# Patient Record
Sex: MALE | Race: BLACK OR AFRICAN AMERICAN | NOT HISPANIC OR LATINO
[De-identification: names, ages, dates, MRNs, and addresses within clinical notes are randomized per-mention and may not be internally consistent; named-entity substitution may affect disease eponyms.]

---

## 2019-05-29 ENCOUNTER — NON-APPOINTMENT (OUTPATIENT)
Age: 69
End: 2019-05-29

## 2019-05-29 ENCOUNTER — APPOINTMENT (OUTPATIENT)
Dept: HEART AND VASCULAR | Facility: CLINIC | Age: 69
End: 2019-05-29
Payer: MEDICARE

## 2019-05-29 VITALS — DIASTOLIC BLOOD PRESSURE: 90 MMHG | SYSTOLIC BLOOD PRESSURE: 130 MMHG

## 2019-05-29 VITALS
HEART RATE: 81 BPM | SYSTOLIC BLOOD PRESSURE: 143 MMHG | DIASTOLIC BLOOD PRESSURE: 105 MMHG | OXYGEN SATURATION: 100 % | BODY MASS INDEX: 20.7 KG/M2 | HEIGHT: 69.5 IN | WEIGHT: 143 LBS

## 2019-05-29 PROCEDURE — 99204 OFFICE O/P NEW MOD 45 MIN: CPT

## 2019-05-29 PROCEDURE — 93000 ELECTROCARDIOGRAM COMPLETE: CPT

## 2019-05-29 NOTE — PHYSICAL EXAM
[General Appearance - Well Developed] : well developed [Normal Appearance] : normal appearance [Well Groomed] : well groomed [General Appearance - Well Nourished] : well nourished [No Deformities] : no deformities [General Appearance - In No Acute Distress] : no acute distress [Normal Conjunctiva] : the conjunctiva exhibited no abnormalities [Normal Oral Mucosa] : normal oral mucosa [Eyelids - No Xanthelasma] : the eyelids demonstrated no xanthelasmas [No Oral Pallor] : no oral pallor [Normal Jugular Venous A Waves Present] : normal jugular venous A waves present [No Oral Cyanosis] : no oral cyanosis [Normal Jugular Venous V Waves Present] : normal jugular venous V waves present [No Jugular Venous Bosch A Waves] : no jugular venous bosch A waves [Heart Rate And Rhythm] : heart rate and rhythm were normal [Heart Sounds] : normal S1 and S2 [Murmurs] : no murmurs present [Respiration, Rhythm And Depth] : normal respiratory rhythm and effort [Exaggerated Use Of Accessory Muscles For Inspiration] : no accessory muscle use [Auscultation Breath Sounds / Voice Sounds] : lungs were clear to auscultation bilaterally [Abdomen Soft] : soft [Abdomen Tenderness] : non-tender [Abdomen Mass (___ Cm)] : no abdominal mass palpated [Gait - Sufficient For Exercise Testing] : the gait was sufficient for exercise testing [Abnormal Walk] : normal gait [Nail Clubbing] : no clubbing of the fingernails [Cyanosis, Localized] : no localized cyanosis [Petechial Hemorrhages (___cm)] : no petechial hemorrhages [Skin Color & Pigmentation] : normal skin color and pigmentation [] : no rash [No Venous Stasis] : no venous stasis [Skin Lesions] : no skin lesions [No Skin Ulcers] : no skin ulcer [Oriented To Time, Place, And Person] : oriented to person, place, and time [No Xanthoma] : no  xanthoma was observed [Mood] : the mood was normal [Affect] : the affect was normal [No Anxiety] : not feeling anxious

## 2019-07-12 ENCOUNTER — OTHER (OUTPATIENT)
Age: 69
End: 2019-07-12

## 2019-08-07 ENCOUNTER — APPOINTMENT (OUTPATIENT)
Dept: HEART AND VASCULAR | Facility: CLINIC | Age: 69
End: 2019-08-07

## 2019-10-26 ENCOUNTER — EMERGENCY (EMERGENCY)
Facility: HOSPITAL | Age: 69
LOS: 1 days | Discharge: HOME | End: 2019-10-26
Attending: EMERGENCY MEDICINE | Admitting: EMERGENCY MEDICINE
Payer: COMMERCIAL

## 2019-10-26 VITALS
HEIGHT: 69 IN | HEART RATE: 94 BPM | TEMPERATURE: 100 F | WEIGHT: 134.92 LBS | RESPIRATION RATE: 16 BRPM | OXYGEN SATURATION: 99 % | SYSTOLIC BLOOD PRESSURE: 143 MMHG | DIASTOLIC BLOOD PRESSURE: 92 MMHG

## 2019-10-26 VITALS
TEMPERATURE: 98 F | HEART RATE: 53 BPM | DIASTOLIC BLOOD PRESSURE: 80 MMHG | OXYGEN SATURATION: 99 % | RESPIRATION RATE: 16 BRPM | SYSTOLIC BLOOD PRESSURE: 140 MMHG

## 2019-10-26 LAB
ALBUMIN SERPL ELPH-MCNC: 3.2 G/DL — LOW (ref 3.3–5)
ALP SERPL-CCNC: 60 U/L — SIGNIFICANT CHANGE UP (ref 40–120)
ALT FLD-CCNC: 10 U/L — SIGNIFICANT CHANGE UP (ref 10–45)
ANION GAP SERPL CALC-SCNC: 10 MMOL/L — SIGNIFICANT CHANGE UP (ref 5–17)
APPEARANCE UR: CLEAR — SIGNIFICANT CHANGE UP
APTT BLD: 31.1 SEC — SIGNIFICANT CHANGE UP (ref 27.5–36.3)
AST SERPL-CCNC: 11 U/L — SIGNIFICANT CHANGE UP (ref 10–40)
BASOPHILS # BLD AUTO: 0.01 K/UL — SIGNIFICANT CHANGE UP (ref 0–0.2)
BASOPHILS NFR BLD AUTO: 0.2 % — SIGNIFICANT CHANGE UP (ref 0–2)
BILIRUB SERPL-MCNC: 0.2 MG/DL — SIGNIFICANT CHANGE UP (ref 0.2–1.2)
BILIRUB UR-MCNC: NEGATIVE — SIGNIFICANT CHANGE UP
BUN SERPL-MCNC: 5 MG/DL — LOW (ref 7–23)
CALCIUM SERPL-MCNC: 8.2 MG/DL — LOW (ref 8.4–10.5)
CHLORIDE SERPL-SCNC: 107 MMOL/L — SIGNIFICANT CHANGE UP (ref 96–108)
CO2 SERPL-SCNC: 25 MMOL/L — SIGNIFICANT CHANGE UP (ref 22–31)
COLOR SPEC: YELLOW — SIGNIFICANT CHANGE UP
CREAT SERPL-MCNC: 0.86 MG/DL — SIGNIFICANT CHANGE UP (ref 0.5–1.3)
DIFF PNL FLD: NEGATIVE — SIGNIFICANT CHANGE UP
EOSINOPHIL # BLD AUTO: 0.07 K/UL — SIGNIFICANT CHANGE UP (ref 0–0.5)
EOSINOPHIL NFR BLD AUTO: 1.7 % — SIGNIFICANT CHANGE UP (ref 0–6)
GLUCOSE SERPL-MCNC: 103 MG/DL — HIGH (ref 70–99)
GLUCOSE UR QL: NEGATIVE — SIGNIFICANT CHANGE UP
HCT VFR BLD CALC: 30.3 % — LOW (ref 39–50)
HGB BLD-MCNC: 10 G/DL — LOW (ref 13–17)
IMM GRANULOCYTES NFR BLD AUTO: 0.2 % — SIGNIFICANT CHANGE UP (ref 0–1.5)
INR BLD: 1.13 — SIGNIFICANT CHANGE UP (ref 0.88–1.16)
KETONES UR-MCNC: NEGATIVE — SIGNIFICANT CHANGE UP
LACTATE SERPL-SCNC: 0.8 MMOL/L — SIGNIFICANT CHANGE UP (ref 0.5–2)
LEUKOCYTE ESTERASE UR-ACNC: NEGATIVE — SIGNIFICANT CHANGE UP
LIDOCAIN IGE QN: 18 U/L — SIGNIFICANT CHANGE UP (ref 7–60)
LYMPHOCYTES # BLD AUTO: 1.08 K/UL — SIGNIFICANT CHANGE UP (ref 1–3.3)
LYMPHOCYTES # BLD AUTO: 25.5 % — SIGNIFICANT CHANGE UP (ref 13–44)
MCHC RBC-ENTMCNC: 30.8 PG — SIGNIFICANT CHANGE UP (ref 27–34)
MCHC RBC-ENTMCNC: 33 GM/DL — SIGNIFICANT CHANGE UP (ref 32–36)
MCV RBC AUTO: 93.2 FL — SIGNIFICANT CHANGE UP (ref 80–100)
MONOCYTES # BLD AUTO: 0.47 K/UL — SIGNIFICANT CHANGE UP (ref 0–0.9)
MONOCYTES NFR BLD AUTO: 11.1 % — SIGNIFICANT CHANGE UP (ref 2–14)
NEUTROPHILS # BLD AUTO: 2.6 K/UL — SIGNIFICANT CHANGE UP (ref 1.8–7.4)
NEUTROPHILS NFR BLD AUTO: 61.3 % — SIGNIFICANT CHANGE UP (ref 43–77)
NITRITE UR-MCNC: NEGATIVE — SIGNIFICANT CHANGE UP
NRBC # BLD: 0 /100 WBCS — SIGNIFICANT CHANGE UP (ref 0–0)
PH UR: 7 — SIGNIFICANT CHANGE UP (ref 5–8)
PLATELET # BLD AUTO: 274 K/UL — SIGNIFICANT CHANGE UP (ref 150–400)
POTASSIUM SERPL-MCNC: 3.4 MMOL/L — LOW (ref 3.5–5.3)
POTASSIUM SERPL-SCNC: 3.4 MMOL/L — LOW (ref 3.5–5.3)
PROT SERPL-MCNC: 6 G/DL — SIGNIFICANT CHANGE UP (ref 6–8.3)
PROT UR-MCNC: NEGATIVE MG/DL — SIGNIFICANT CHANGE UP
PROTHROM AB SERPL-ACNC: 12.8 SEC — SIGNIFICANT CHANGE UP (ref 10–12.9)
RBC # BLD: 3.25 M/UL — LOW (ref 4.2–5.8)
RBC # FLD: 14.9 % — HIGH (ref 10.3–14.5)
SODIUM SERPL-SCNC: 142 MMOL/L — SIGNIFICANT CHANGE UP (ref 135–145)
SP GR SPEC: 1.01 — SIGNIFICANT CHANGE UP (ref 1–1.03)
UROBILINOGEN FLD QL: 0.2 E.U./DL — SIGNIFICANT CHANGE UP
WBC # BLD: 4.24 K/UL — SIGNIFICANT CHANGE UP (ref 3.8–10.5)
WBC # FLD AUTO: 4.24 K/UL — SIGNIFICANT CHANGE UP (ref 3.8–10.5)

## 2019-10-26 PROCEDURE — 74177 CT ABD & PELVIS W/CONTRAST: CPT

## 2019-10-26 PROCEDURE — 36415 COLL VENOUS BLD VENIPUNCTURE: CPT

## 2019-10-26 PROCEDURE — 99284 EMERGENCY DEPT VISIT MOD MDM: CPT

## 2019-10-26 PROCEDURE — 86900 BLOOD TYPING SEROLOGIC ABO: CPT

## 2019-10-26 PROCEDURE — 80053 COMPREHEN METABOLIC PANEL: CPT

## 2019-10-26 PROCEDURE — 85610 PROTHROMBIN TIME: CPT

## 2019-10-26 PROCEDURE — 99284 EMERGENCY DEPT VISIT MOD MDM: CPT | Mod: 25

## 2019-10-26 PROCEDURE — 81003 URINALYSIS AUTO W/O SCOPE: CPT

## 2019-10-26 PROCEDURE — 86850 RBC ANTIBODY SCREEN: CPT

## 2019-10-26 PROCEDURE — 85025 COMPLETE CBC W/AUTO DIFF WBC: CPT

## 2019-10-26 PROCEDURE — 85730 THROMBOPLASTIN TIME PARTIAL: CPT

## 2019-10-26 PROCEDURE — 74177 CT ABD & PELVIS W/CONTRAST: CPT | Mod: 26,77

## 2019-10-26 PROCEDURE — 83605 ASSAY OF LACTIC ACID: CPT

## 2019-10-26 PROCEDURE — 83690 ASSAY OF LIPASE: CPT

## 2019-10-26 PROCEDURE — 74177 CT ABD & PELVIS W/CONTRAST: CPT | Mod: 26

## 2019-10-26 PROCEDURE — 86901 BLOOD TYPING SEROLOGIC RH(D): CPT

## 2019-10-26 RX ORDER — SODIUM CHLORIDE 9 MG/ML
1000 INJECTION INTRAMUSCULAR; INTRAVENOUS; SUBCUTANEOUS ONCE
Refills: 0 | Status: COMPLETED | OUTPATIENT
Start: 2019-10-26 | End: 2019-10-26

## 2019-10-26 RX ORDER — IOHEXOL 300 MG/ML
30 INJECTION, SOLUTION INTRAVENOUS ONCE
Refills: 0 | Status: COMPLETED | OUTPATIENT
Start: 2019-10-26 | End: 2019-10-26

## 2019-10-26 RX ADMIN — SODIUM CHLORIDE 2000 MILLILITER(S): 9 INJECTION INTRAMUSCULAR; INTRAVENOUS; SUBCUTANEOUS at 15:54

## 2019-10-26 RX ADMIN — IOHEXOL 30 MILLILITER(S): 300 INJECTION, SOLUTION INTRAVENOUS at 15:29

## 2019-10-26 NOTE — ED PROVIDER NOTE - PHYSICAL EXAMINATION
CONSTITUTIONAL: Well appearing, well nourished, awake, alert and in no apparent distress.  HEENT: Head is atraumatic. Eyes clear bilaterally, normal EOMI. Airway patent.  CARDIAC: Normal rate, regular rhythm.  Heart sounds S1, S2.   RESPIRATORY: Breath sounds clear and equal bilaterally. no tachypnea, respiratory distress.   GASTROINTESTINAL: Abdomen soft, non-tender, no guarding, distension. L mass in scrotum and L inguinal region.   MUSCULOSKELETAL: Spine appears normal, no midline spinal tenderness, range of motion is not limited, no muscle or joint tenderness. no bony tenderness. no JVD, peripheral edema.   NEUROLOGICAL: Alert and oriented, no focal deficits, no motor or sensory deficits.  SKIN: Skin normal color for race, warm, dry and intact. No evidence of rash.  PSYCHIATRIC: Alert and oriented to person, place, time/situation. normal mood and affect. no apparent risk to self or others.

## 2019-10-26 NOTE — ED ADULT NURSE NOTE - OBJECTIVE STATEMENT
the pt is a 68 y/o male who complaining of groin pain. Pt has hx of inguinal hernia with repair. Pt c/o discomfort in the groin area.

## 2019-10-26 NOTE — ED PROVIDER NOTE - NSFOLLOWUPINSTRUCTIONS_ED_ALL_ED_FT
Hernia, Adult  Image       Image   A hernia is the bulging of an organ or tissue through a weak spot in the muscles of the abdomen (abdominal wall). Hernias develop most often near the belly button (navel) or the area where the leg meets the lower abdomen (groin).  Common types of hernias include:  Incisional hernia. This type bulges through a scar from an abdominal surgery.Umbilical hernia. This type develops near the navel.Inguinal hernia. This type develops in the groin or scrotum.Femoral hernia. This type develops under the groin, in the upper thigh area.Hiatal hernia. This type occurs when part of the stomach slides above the muscle that separates the abdomen from the chest (diaphragm).What are the causes?  This condition may be caused by:  Heavy lifting.Coughing over a long period of time.Straining to have a bowel movement. Constipation can lead to straining.An incision made during an abdominal surgery.A physical problem that is present at birth (congenital defect).Being overweight or obese.Smoking.Excess fluid in the abdomen.Undescended testicles in males.What are the signs or symptoms?  The main symptom is a skin-colored, rounded bulge in the area of the hernia. However, a bulge may not always be present. It may grow bigger or be more visible when you cough or strain (such as when lifting something heavy).  A hernia that can be pushed back into the area (is reducible) rarely causes pain. A hernia that cannot be pushed back into the area (is incarcerated) may lose its blood supply (become strangulated). A hernia that is incarcerated may cause:  Pain.Fever.Nausea and vomiting.Swelling.Constipation.How is this diagnosed?  A hernia may be diagnosed based on:  Your symptoms and medical history.A physical exam. Your health care provider may ask you to cough or move in certain ways to see if the hernia becomes visible.Imaging tests, such as:  X-rays.Ultrasound.CT scan.How is this treated?  A hernia that is small and painless may not need to be treated. A hernia that is large or painful may be treated with surgery. Inguinal hernias may be treated with surgery to prevent incarceration or strangulation. Strangulated hernias are always treated with surgery because a lack of blood supply to the trapped organ or tissue can cause it to die.  Surgery to treat a hernia involves pushing the bulge back into place and repairing the weak area of the muscle or abdominal wall.  Follow these instructions at home:  Activity     Avoid straining.Do not lift anything that is heavier than 10 lb (4.5 kg), or the limit that you are told, until your health care provider says that it is safe.When lifting heavy objects, lift with your leg muscles, not your back muscles.Preventing constipation     Take actions to prevent constipation. Constipation leads to straining with bowel movements, which can make a hernia worse or cause a hernia repair to break down. Your health care provider may recommend that you:  Drink enough fluid to keep your urine pale yellow.Eat foods that are high in fiber, such as fresh fruits and vegetables, whole grains, and beans.Limit foods that are high in fat and processed sugars, such as fried or sweet foods.Take an over-the-counter or prescription medicine for constipation.General instructions     When coughing, try to cough gently.You may try to push the hernia back in place by very gently pressing on it while lying down. Do not try to force the bulge back in if it will not push in easily.If you are overweight, work with your health care provider to lose weight safely.Do not use any products that contain nicotine or tobacco, such as cigarettes and e-cigarettes. If you need help quitting, ask your health care provider.If you are scheduled for hernia repair, watch your hernia for any changes in shape, size, or color. Tell your health care provider about any changes or new symptoms.Take over-the-counter and prescription medicines only as told by your health care provider.Keep all follow-up visits as told by your health care provider. This is important.Contact a health care provider if:  You develop new pain, swelling, or redness around your hernia.You have signs of constipation, such as:  Fewer bowel movements in a week than normal.Difficulty having a bowel movement.Stools that are dry, hard, or larger than normal.Get help right away if:  You have a fever.You have abdomen pain that gets worse.You feel nauseous or you vomit.You cannot push the hernia back in place by very gently pressing on it while lying down. Do not try to force the bulge back in if it will not push in easily.The hernia:  Changes in shape, size, or color.Feels hard or tender.These symptoms may represent a serious problem that is an emergency. Do not wait to see if the symptoms will go away. Get medical help right away. Call your local emergency services (911 in the [place]   Summary  A hernia is the bulging of an organ or tissue through a weak spot in the muscles of the abdomen (abdominal wall).The main symptom is a skin-colored, rounded lump (bulge) in the hernia area. However, a bulge may not always be present. It may grow bigger or more visible when you cough or strain (such as when having a bowel movement).A hernia that is small and painless may not need to be treated. A hernia that is large or painful may be treated with surgery.Surgery to treat a hernia involves pushing the bulge back into place and repairing the weak part of the abdomen.This information is not intended to replace advice given to you by your health care provider. Make sure you discuss any questions you have with your health care provider. ***PLEASE FOLLOW UP AT [facility]    [address]        Hernia, Adult  Image       Image   A hernia is the bulging of an organ or tissue through a weak spot in the muscles of the abdomen (abdominal wall). Hernias develop most often near the belly button (navel) or the area where the leg meets the lower abdomen (groin).  Common types of hernias include:  Incisional hernia. This type bulges through a scar from an abdominal surgery.Umbilical hernia. This type develops near the navel.Inguinal hernia. This type develops in the groin or scrotum.Femoral hernia. This type develops under the groin, in the upper thigh area.Hiatal hernia. This type occurs when part of the stomach slides above the muscle that separates the abdomen from the chest (diaphragm).What are the causes?  This condition may be caused by:  Heavy lifting.Coughing over a long period of time.Straining to have a bowel movement. Constipation can lead to straining.An incision made during an abdominal surgery.A physical problem that is present at birth (congenital defect).Being overweight or obese.Smoking.Excess fluid in the abdomen.Undescended testicles in males.What are the signs or symptoms?  The main symptom is a skin-colored, rounded bulge in the area of the hernia. However, a bulge may not always be present. It may grow bigger or be more visible when you cough or strain (such as when lifting something heavy).  A hernia that can be pushed back into the area (is reducible) rarely causes pain. A hernia that cannot be pushed back into the area (is incarcerated) may lose its blood supply (become strangulated). A hernia that is incarcerated may cause:  Pain.Fever.Nausea and vomiting.Swelling.Constipation.How is this diagnosed?  A hernia may be diagnosed based on:  Your symptoms and medical history.A physical exam. Your health care provider may ask you to cough or move in certain ways to see if the hernia becomes visible.Imaging tests, such as:  X-rays.Ultrasound.CT scan.How is this treated?  A hernia that is small and painless may not need to be treated. A hernia that is large or painful may be treated with surgery. Inguinal hernias may be treated with surgery to prevent incarceration or strangulation. Strangulated hernias are always treated with surgery because a lack of blood supply to the trapped organ or tissue can cause it to die.  Surgery to treat a hernia involves pushing the bulge back into place and repairing the weak area of the muscle or abdominal wall.  Follow these instructions at home:  Activity     Avoid straining.Do not lift anything that is heavier than 10 lb (4.5 kg), or the limit that you are told, until your health care provider says that it is safe.When lifting heavy objects, lift with your leg muscles, not your back muscles.Preventing constipation     Take actions to prevent constipation. Constipation leads to straining with bowel movements, which can make a hernia worse or cause a hernia repair to break down. Your health care provider may recommend that you:  Drink enough fluid to keep your urine pale yellow.Eat foods that are high in fiber, such as fresh fruits and vegetables, whole grains, and beans.Limit foods that are high in fat and processed sugars, such as fried or sweet foods.Take an over-the-counter or prescription medicine for constipation.General instructions     When coughing, try to cough gently.You may try to push the hernia back in place by very gently pressing on it while lying down. Do not try to force the bulge back in if it will not push in easily.If you are overweight, work with your health care provider to lose weight safely.Do not use any products that contain nicotine or tobacco, such as cigarettes and e-cigarettes. If you need help quitting, ask your health care provider.If you are scheduled for hernia repair, watch your hernia for any changes in shape, size, or color. Tell your health care provider about any changes or new symptoms.Take over-the-counter and prescription medicines only as told by your health care provider.Keep all follow-up visits as told by your health care provider. This is important.Contact a health care provider if:  You develop new pain, swelling, or redness around your hernia.You have signs of constipation, such as:  Fewer bowel movements in a week than normal.Difficulty having a bowel movement.Stools that are dry, hard, or larger than normal.Get help right away if:  You have a fever.You have abdomen pain that gets worse.You feel nauseous or you vomit.You cannot push the hernia back in place by very gently pressing on it while lying down. Do not try to force the bulge back in if it will not push in easily.The hernia:  Changes in shape, size, or color.Feels hard or tender.These symptoms may represent a serious problem that is an emergency. Do not wait to see if the symptoms will go away. Get medical help right away. Call your local emergency services (911 in the [place]   Summary  A hernia is the bulging of an organ or tissue through a weak spot in the muscles of the abdomen (abdominal wall).The main symptom is a skin-colored, rounded lump (bulge) in the hernia area. However, a bulge may not always be present. It may grow bigger or more visible when you cough or strain (such as when having a bowel movement).A hernia that is small and painless may not need to be treated. A hernia that is large or painful may be treated with surgery.Surgery to treat a hernia involves pushing the bulge back into place and repairing the weak part of the abdomen.This information is not intended to replace advice given to you by your health care provider. Make sure you discuss any questions you have with your health care provider.

## 2019-10-26 NOTE — CONSULT NOTE ADULT - ASSESSMENT
Mr. Boggs is a 68 yo M with long-standing chronic left inguinal hernia without signs of incarceration or strangulation.     - CT imaging shows passage of PO contrast within small intestine within hernia sac and continues to large bowel and rectum. No signs of obstruction.   - Patient instructed to return to ED of fevers, chills, increasing pain, signs of hernia incarceration or strangulation, or any other concerns.  - Patient instructed to follow-up with [facility] outpatient -- given address and phone number: [address] [place] [address], [phone].  - Plan discussed with attending and chief resident.

## 2019-10-26 NOTE — CONSULT NOTE ADULT - ATTENDING COMMENTS
Pt was seen and examined by me in ER.  Chronic large left inguinal hernia/scrotal hernia.  Contains loops of bowel.  No skin changes.  No clinical signs of bowel obstruction. No distention.  Passing flatus.  Recommend to repeat CT with PO contrast.  If proves to have no obstruction, would recommend outpatient follow up with ACS clinic and elective hernia repair.

## 2019-10-26 NOTE — ED ADULT NURSE NOTE - NSIMPLEMENTINTERV_GEN_ALL_ED
Implemented All Universal Safety Interventions:  [place] to call system. Call bell, personal items and telephone within reach. Instruct patient to call for assistance. Room bathroom lighting operational. Non-slip footwear when patient is off stretcher. Physically safe environment: no spills, clutter or unnecessary equipment. Stretcher in lowest position, wheels locked, appropriate side rails in place.

## 2019-10-26 NOTE — ED PROVIDER NOTE - PATIENT PORTAL LINK FT
You can access the FollowMyHealth Patient Portal offered by [facility] by registering at the following website: [email or web address]. By joining [employer]’s FollowMyHealth portal, you will also be able to view your health information using other applications (apps) compatible with our system.

## 2019-10-26 NOTE — ED PROVIDER NOTE - OBJECTIVE STATEMENT
68 yo with 2 prior inguinal, scrotal hernia repairs w  L inguinal pain for the past one year, states was referred to ED by pcp. No  n/v/f/c. states passed gas and flatus this morning. 70 yo with 2 prior inguinal hernias, scrotal hernia repairs w  L inguinal pain for the past one year, states was referred to ED by pcp. No  n/v/f/c. states passed gas and flatus this morning. 68 yo with 2 prior inguinal hernias repairs w  L inguinal pain and swelling for the past one year, states was referred to ED by pcp. No  n/v/f/c. states passed gas and flatus this morning. no abdominal distension. no GI bleed sx. 70 yo with 2 prior inguinal hernias repair w L inguinal pain and swelling for the past one year, states was referred to ED by pcp. No  n/v/f/c. states passed gas and flatus this morning. no abdominal distension. no GI bleed sx. no cp, sob or other complaints.

## 2019-10-26 NOTE — CONSULT NOTE ADULT - SUBJECTIVE AND OBJECTIVE BOX
GENERAL SURGERY CONSULT NOTE    HPI:    Mr. Boggs is a 68 yo M with history of long-standing left inguinal hernia presenting to ED because of suggestion from his PCP. Patient reports that he first noticed his hernia 1 year. He reports that it has been enlarging, but otherwise denies any increasing pain or changes in his symptoms. He denies fevers, chills, nausea, or emesis. He had a normal bowel movement this morning without hematochezia or melena and report actively passing flatus including in the ED.    Patient is afebrile and remaining vitals are within normal limits. WBC is 4, lactate is wnl, CT imaging shows a chronic left inguinal hernia, without signs of obstruction.    PMH/PSx; prior R inguinal hernia repair  Allergies: NKDA  Meds: denies  FH: noncontributory  SH: noncontributory    REVIEW OF SYSTEMS:   Pertinent positives/negatives noted in HPI.       Vital Signs Last 24 Hrs  T(C): 36.9 (26 Oct 2019 18:12), Max: 37.6 (26 Oct 2019 10:23)  T(F): 98.4 (26 Oct 2019 18:12), Max: 99.7 (26 Oct 2019 10:23)  HR: 53 (26 Oct 2019 18:12) (53 - 94)  BP: 140/80 (26 Oct 2019 18:12) (140/80 - 163/94)  BP(mean): --  RR: 16 (26 Oct 2019 18:12) (16 - 16)  SpO2: 99% (26 Oct 2019 18:12) (96% - 99%)    PHYSICAL EXAM:  General: no acute distress  Cardiovascular: NSR  Pulmonary: nonlabored breathing, no respiratory distress  Abdomen: mild LLQ tenderness without guarding or rebound. Large left inguinal hernia extending into left hemiscrotum; mild tenderness to palpation. No overlying skin changes.  Groin: R groin open inguinal hernia repair incisional scar - well-healed  Extremities: nonedematous    LABS:                        10.0   4.24  )-----------( 274      ( 26 Oct 2019 11:21 )             30.3     10-26    142  |  107  |  5<L>  ----------------------------<  103<H>  3.4<L>   |  25  |  0.86    Ca    8.2<L>      26 Oct 2019 11:21    TPro  6.0  /  Alb  3.2<L>  /  TBili  0.2  /  DBili  x   /  AST  11  /  ALT  10  /  AlkPhos  60  10-26    PT/INR - ( 26 Oct 2019 17:06 )   PT: 12.8 sec;   INR: 1.13          PTT - ( 26 Oct 2019 17:06 )  PTT:31.1 sec  Urinalysis Basic - ( 26 Oct 2019 11:47 )    Color: Yellow / Appearance: Clear / S.010 / pH: x  Gluc: x / Ketone: NEGATIVE  / Bili: Negative / Urobili: 0.2 E.U./dL   Blood: x / Protein: NEGATIVE mg/dL / Nitrite: NEGATIVE   Leuk Esterase: NEGATIVE / RBC: x / WBC x   Sq Epi: x / Non Sq Epi: x / Bacteria: x

## 2019-10-30 DIAGNOSIS — K40.90 UNILATERAL INGUINAL HERNIA, WITHOUT OBSTRUCTION OR GANGRENE, NOT SPECIFIED AS RECURRENT: ICD-10-CM

## 2019-10-30 DIAGNOSIS — R10.32 LEFT LOWER QUADRANT PAIN: ICD-10-CM

## 2019-11-01 ENCOUNTER — APPOINTMENT (OUTPATIENT)
Dept: SURGERY | Facility: CLINIC | Age: 69
End: 2019-11-01
Payer: MEDICARE

## 2019-11-01 VITALS
HEIGHT: 69.5 IN | WEIGHT: 142 LBS | HEART RATE: 82 BPM | SYSTOLIC BLOOD PRESSURE: 158 MMHG | BODY MASS INDEX: 20.56 KG/M2 | TEMPERATURE: 99.1 F | DIASTOLIC BLOOD PRESSURE: 100 MMHG

## 2019-11-01 DIAGNOSIS — K40.90 UNILATERAL INGUINAL HERNIA, W/OUT OBSTRUCTION OR GANGRENE, NOT SPECIFIED AS RECURRENT: ICD-10-CM

## 2019-11-01 DIAGNOSIS — I10 ESSENTIAL (PRIMARY) HYPERTENSION: ICD-10-CM

## 2019-11-01 DIAGNOSIS — F17.200 NICOTINE DEPENDENCE, UNSPECIFIED, UNCOMPLICATED: ICD-10-CM

## 2019-11-01 DIAGNOSIS — F17.210 NICOTINE DEPENDENCE, CIGARETTES, UNCOMPLICATED: ICD-10-CM

## 2019-11-01 DIAGNOSIS — I73.9 PERIPHERAL VASCULAR DISEASE, UNSPECIFIED: ICD-10-CM

## 2019-11-01 DIAGNOSIS — Z00.00 ENCOUNTER FOR GENERAL ADULT MEDICAL EXAMINATION W/OUT ABNORMAL FINDINGS: ICD-10-CM

## 2019-11-01 PROCEDURE — 99203 OFFICE O/P NEW LOW 30 MIN: CPT

## 2019-11-01 NOTE — HISTORY OF PRESENT ILLNESS
[de-identified] : patient had recurrent inguinal hernia that was repaired last in 2008 , now large , incarcerated left scrotal and inguinal with no signs of obstruction r

## 2019-11-01 NOTE — PHYSICAL EXAM
[JVD] : no jugular venous distention  [Normal Breath Sounds] : Normal breath sounds [Normal Heart Sounds] : normal heart sounds [Abdominal Masses] : No abdominal masses [Abdomen Tenderness] : ~T ~M No abdominal tenderness [Stool Sample Taken] : No stool obtained  on rectal exam [No Rash or Lesion] : No rash or lesion [Purpura] : no purpura  [Petechiae] : no petechiae [Skin Ulcer] : no ulcer [Skin Induration] : no induration [Alert] : alert [Oriented to Person] : oriented to person [Oriented to Place] : oriented to place [Oriented to Time] : oriented to time [Calm] : calm [de-identified] : appears well  [de-identified] : CHANEL [de-identified] : large left inguinal/scrotal hernia without bowel obstruction , non reducible.

## 2019-11-01 NOTE — ASSESSMENT
[FreeTextEntry1] : left inguinal hernia \par for open repair with mesh \par will need PAST \par stop smoking

## 2019-11-12 ENCOUNTER — FORM ENCOUNTER (OUTPATIENT)
Age: 69
End: 2019-11-12

## 2019-11-13 ENCOUNTER — OUTPATIENT (OUTPATIENT)
Dept: OUTPATIENT SERVICES | Facility: HOSPITAL | Age: 69
LOS: 1 days | Discharge: HOME | End: 2019-11-13
Payer: MEDICARE

## 2019-11-13 VITALS
TEMPERATURE: 97 F | RESPIRATION RATE: 18 BRPM | WEIGHT: 143.96 LBS | OXYGEN SATURATION: 98 % | HEART RATE: 88 BPM | HEIGHT: 69 IN | SYSTOLIC BLOOD PRESSURE: 150 MMHG | DIASTOLIC BLOOD PRESSURE: 92 MMHG

## 2019-11-13 DIAGNOSIS — Z98.890 OTHER SPECIFIED POSTPROCEDURAL STATES: Chronic | ICD-10-CM

## 2019-11-13 DIAGNOSIS — K40.90 UNILATERAL INGUINAL HERNIA, WITHOUT OBSTRUCTION OR GANGRENE, NOT SPECIFIED AS RECURRENT: ICD-10-CM

## 2019-11-13 DIAGNOSIS — Z01.818 ENCOUNTER FOR OTHER PREPROCEDURAL EXAMINATION: ICD-10-CM

## 2019-11-13 PROCEDURE — 71046 X-RAY EXAM CHEST 2 VIEWS: CPT | Mod: 26

## 2019-11-13 PROCEDURE — 93010 ELECTROCARDIOGRAM REPORT: CPT

## 2019-11-18 PROBLEM — Z87.19 PERSONAL HISTORY OF OTHER DISEASES OF THE DIGESTIVE SYSTEM: Chronic | Status: ACTIVE | Noted: 2019-11-13

## 2019-11-26 ENCOUNTER — CHART COPY (OUTPATIENT)
Age: 69
End: 2019-11-26

## 2019-11-27 ENCOUNTER — APPOINTMENT (OUTPATIENT)
Dept: SURGERY | Facility: AMBULATORY SURGERY CENTER | Age: 69
End: 2019-11-27

## 2020-12-22 NOTE — REVIEW OF SYSTEMS
normal appearance , without tenderness upon palpation , no deformities , trachea midline , Thyroid normal size , no thyroid nodules , no masses , no JVD , thyroid nontender [Shortness Of Breath] : shortness of breath [Muscle Cramps] : muscle cramps [Negative] : Heme/Lymph

## 2023-01-08 NOTE — ED ADULT NURSE NOTE - ADDITIONAL COMPLAINTS
- CT chest/abd/pelvis    - L renal neoplasm [id number] cm    - LLL nodule concern for mets    - L4 lucent lesion c/f bone mets 1.5 x 1.4 cm  - Suspected renal malignancy with mets with associated severe night sweats  - Continue Prednisone 40mg qhs  - Plan for bone scan on 1/8, followed by bone biopsy   - NPO after midnight except meds tonight   - Heme/onc following, recs appreciated Additional Complaints

## 2023-05-02 NOTE — ED PROVIDER NOTE - HISTORY ATTESTATION, MLM
I have reviewed and confirmed nurses' notes... Topical Sulfur Applications Counseling: Topical Sulfur Counseling: Patient counseled that this medication may cause skin irritation or allergic reactions.  In the event of skin irritation, the patient was advised to reduce the amount of the drug applied or use it less frequently.   The patient verbalized understanding of the proper use and possible adverse effects of topical sulfur application.  All of the patient's questions and concerns were addressed.

## 2024-02-24 NOTE — H&P PST ADULT - HISTORY OF PRESENT ILLNESS
yes
PATIENT CURRENTLY DENIES CHEST PAIN  SHORTNESS OF BREATH  PALPITATIONS,  DYSURIA, OR UPPER RESPIRATORY INFECTION IN PAST 2 WEEKS  EXERCISE  TOLERANCE  1-2 FLIGHT OF STAIRS  WITHOUT SHORTNESS OF BREATH     pt incontinent of urine
yes